# Patient Record
(demographics unavailable — no encounter records)

---

## 2024-10-15 NOTE — DISCUSSION/SUMMARY
[FreeTextEntry1] : Self breast exam stressed Keep menstrual calendar Follow-up with MARIELA as scheduled Follow-up yearly or as needed
Yes

## 2024-10-15 NOTE — PHYSICAL EXAM
[Chaperone Present] : A chaperone was present in the examining room during all aspects of the physical examination [FreeTextEntry2] : Jolanta [Appropriately responsive] : appropriately responsive [Alert] : alert [No Acute Distress] : no acute distress [No Lymphadenopathy] : no lymphadenopathy [Regular Rate Rhythm] : regular rate rhythm [No Murmurs] : no murmurs [Clear to Auscultation B/L] : clear to auscultation bilaterally [Soft] : soft [Non-tender] : non-tender [Non-distended] : non-distended [No HSM] : No HSM [No Lesions] : no lesions [No Mass] : no mass [Oriented x3] : oriented x3 [Examination Of The Breasts] : a normal appearance [No Masses] : no breast masses were palpable [Labia Majora] : normal [Labia Minora] : normal [Normal] : normal [Uterine Adnexae] : normal

## 2024-10-15 NOTE — HISTORY OF PRESENT ILLNESS
[FreeTextEntry1] : Patient is 33 years old para 0-0-0-0 last menstrual period September 30, 2024 She is under the care of reproductive endocrinologist Dr. Ugo montalvo. She is status post hysteroscopy D&C, polypectomy and failed embryo transfer September 12, 2024 She will be having a follow-up hysteroscopy in 1 week. Pap performed on September 3, 2024 was noted to be negative for intraepithelial lesion or malignancy.

## 2025-04-17 NOTE — DISCUSSION/SUMMARY
[EKG obtained to assist in diagnosis and management of assessed problem(s)] : EKG obtained to assist in diagnosis and management of assessed problem(s) [FreeTextEntry1] : Plan:  -get 2d echo  -Monitor BP at home and bring bp cuff in next visit

## 2025-04-17 NOTE — HISTORY OF PRESENT ILLNESS
[FreeTextEntry1] : 34-year-old female with past medical history of hypothyroidism was referred by Dr. Maldonado as patient is 19 weeks pregnant and having high blood pressure.  The patient denies CP, bleeding, SOB at rest, PND, abdominal discomfort, LH/dizziness, BLE edema, palpitations, and syncope.

## 2025-04-17 NOTE — ADDENDUM
[FreeTextEntry1] : 34-year-old female with no prior cardiac history presenting for evaluation of hypertension.  Patient is 19 weeks pregnant.  SBP recently found to be in 130s in office.  No functional limitations.  Pregnancy has been unremarkable.  No family history of pregnancy complications.  BP today 138/80.  Goal should be less than 130 but no indication for treatment at this time as an office BP not reflective of true blood pressure.  Recommend home monitoring.  If continues to be elevated then can consider treatment.  Jesus Archuleta MD Interventional Cardiology/Advanced Heart Failure Transplant [facility]

## 2025-05-08 NOTE — HISTORY OF PRESENT ILLNESS
[FreeTextEntry1] : 34-year-old female with past medical history of hypothyroidism was referred by Dr. Maldonado as patient is 22 weeks pregnant and having high blood pressure.  The patient denies CP, bleeding, SOB at rest, PND, abdominal discomfort, LH/dizziness, BLE edema, palpitations, and syncope.  5/8/25: 4/24/25: EF: 60% Patient states BP at home has been mostly in the 120s  The patient denies CP, bleeding, SOB at rest, PND, abdominal discomfort, LH/dizziness, BLE edema, palpitations, and syncope.

## 2025-05-08 NOTE — ADDENDUM
[FreeTextEntry1] : 34-year-old female with no prior cardiac history presenting for evaluation of hypertension.  Patient with first pregnancy  SBP at home ~110-120s. No changes in symptoms. No limitations. Pregnancy has been going well.  TTE reviewed with normal function.  Recommend continued home monitoring. Return to clinic PRN.  Jesus Archuleta MD Interventional Cardiology/Advanced Heart Failure Transplant [facility]

## 2025-07-21 NOTE — HISTORY OF PRESENT ILLNESS
[FreeTextEntry1] : 34-year-old female with past medical history of hypothyroidism was referred by Dr. Maldonado as patient is 22 weeks pregnant and having high blood pressure.  The patient denies CP, bleeding, SOB at rest, PND, abdominal discomfort, LH/dizziness, BLE edema, palpitations, and syncope.  5/8/25: 4/24/25: EF: 60% Patient states BP at home has been mostly in the 120s  The patient denies CP, bleeding, SOB at rest, PND, abdominal discomfort, LH/dizziness, BLE edema, palpitations, and syncope.  07/21/2025: Patient has been admitted to [facility] 1 week ago with elevated diastolic pressure 90-95 mmHg for 1 night. CT scan showed no evidence of PE. Blood work c/w mild anemia which is being handled by her GYN. BP at home 120/80 on average. Patient feels well. Denies chest pain, SOB, dizziness, leg edema. SR on EKG

## 2025-07-21 NOTE — ADDENDUM
[FreeTextEntry1] : 34-year-old female with no prior cardiac history presenting for evaluation of hypertension.  Patient with first pregnancy  SBP at home ~110-120s. No changes in symptoms. No limitations. Pregnancy has been going well.  TTE reviewed with normal function.  Recommend continued home monitoring. Return to clinic PRN.  Jesus Archuleta MD Interventional Cardiology/Advanced Heart Failure Transplant [facility]

## 2025-07-21 NOTE — ASSESSMENT
[FreeTextEntry1] : Patient was instructed to establish care with general cardiology and continue monitor her BP daily at home.